# Patient Record
(demographics unavailable — no encounter records)

---

## 2024-10-08 NOTE — HISTORY OF PRESENT ILLNESS
[Dizziness] : dizziness [de-identified] : 85 yo male with chronic AF, CAD s/p PCI, cardiomyopathy ([place] True Style single-chamber ICD).  He had syncope and ICD shock in ER November 2021, with episodes of VF on ICD. He was started on amiodarone, and treated for elevated BP. Cath was ok. ICD reached ARCELIA on 11/30/21. He underwent generator replacement in February 2022. He had an episode of apparent lead noise in May 2023 detected as arrhythmia, but no shocks were delivered. More recently has noted dizziness and low BP.  Medications have been adjusted.  He notes improvement of dizziness.  He denies syncope.  Device check shows a chronically elevated RV threshold, but otherwise normal ICD function and no recent ventricular arrhythmias.  TTE 1/2024: LA moderately dilated, small pericardial effusion w/o tamponade, EF 58%, mild AR, no AS, mild MR, mild pulm HTN.

## 2024-10-08 NOTE — REVIEW OF SYSTEMS
[Fever] : no fever [Chills] : no chills [Dyspnea on exertion] : not dyspnea during exertion [Chest Discomfort] : no chest discomfort [Palpitations] : no palpitations [Syncope] : no syncope [Cough] : no cough [Abdominal Pain] : no abdominal pain [Nausea] : no nausea [Vomiting] : no vomiting

## 2024-10-08 NOTE — PHYSICAL EXAM
[General Appearance - Well Developed] : well developed [General Appearance - Well Nourished] : well nourished [General Appearance - In No Acute Distress] : no acute distress [Heart Sounds] : normal S1 and S2 [Murmurs] : no murmurs present [] : no respiratory distress [Respiration, Rhythm And Depth] : normal respiratory rhythm and effort [Auscultation Breath Sounds / Voice Sounds] : lungs were clear to auscultation bilaterally [Left Infraclavicular] : left infraclavicular area [Clean] : clean [Dry] : dry [Well-Healed] : well-healed [Abdomen Soft] : soft [Abdomen Tenderness] : non-tender [Nail Clubbing] : no clubbing of the fingernails [Erythema] : not erythematous [Warm] : not warm [Tender] : not tender

## 2025-03-25 NOTE — PHYSICAL EXAM
[Outer Ear] : the ears and nose were normal in appearance [Hearing Threshold Finger Rub Not [place]] : hearing was normal [Examination Of The Oral Cavity] : the lips and gums were normal [] : no respiratory distress [Exaggerated Use Of Accessory Muscles For Inspiration] : no accessory muscle use [Nondistended] : nondistended [Cervical Lymph Nodes Enlarged Posterior Bilaterally] : posterior cervical [Cervical Lymph Nodes Enlarged Anterior Bilaterally] : anterior cervical [Musculoskeletal - Swelling] : no joint swelling [Normal] : oriented to person, place and time, the affect was normal, the mood was normal and not anxious [de-identified] : healing incision right forehead from recent Mohs; sticthes still in place; no bleeding or discharge [de-identified] : irregularly shaped 1.5 x 1.5 cm erythematous scaly lesion left upper lip

## 2025-03-25 NOTE — REVIEW OF SYSTEMS
[Skin Wound] : skin wound [Negative] : Allergic/Immunologic [de-identified] : healing incision right forehead from recent Mohs

## 2025-03-25 NOTE — VITALS
[Maximal Pain Intensity: 0/10] : 0/10 [Date: ____________] : Patient's last distress assessment performed on [unfilled]. [1 - Distress Level] : Distress Level: 1 [80: Normal activity with effort; some signs or symptoms of disease.] : 80: Normal activity with effort; some signs or symptoms of disease.

## 2025-03-25 NOTE — REVIEW OF SYSTEMS
[Skin Wound] : skin wound [Negative] : Allergic/Immunologic [de-identified] : healing incision right forehead from recent Mohs

## 2025-03-25 NOTE — PHYSICAL EXAM
[Outer Ear] : the ears and nose were normal in appearance [Hearing Threshold Finger Rub Not [place]] : hearing was normal [Examination Of The Oral Cavity] : the lips and gums were normal [] : no respiratory distress [Exaggerated Use Of Accessory Muscles For Inspiration] : no accessory muscle use [Nondistended] : nondistended [Cervical Lymph Nodes Enlarged Posterior Bilaterally] : posterior cervical [Cervical Lymph Nodes Enlarged Anterior Bilaterally] : anterior cervical [Musculoskeletal - Swelling] : no joint swelling [Normal] : oriented to person, place and time, the affect was normal, the mood was normal and not anxious [de-identified] : healing incision right forehead from recent Mohs; sticthes still in place; no bleeding or discharge [de-identified] : irregularly shaped 1.5 x 1.5 cm erythematous scaly lesion left upper lip

## 2025-03-25 NOTE — HISTORY OF PRESENT ILLNESS
[FreeTextEntry1] : Mr. Jerez is an 85-year-old male with multiple skin cancers, presenting today for consultation for radiation. He has previous history of radiation 6 years ago to the left nasal ala and tip of nose, treated by Dr. Cassidy.  In 2023, he was found to have basal cell carcinoma to his left upper cutaneous lip but d/t multiple life events, patient deferred treatment at that time.  02/24/25 Biopsy with Dr. Elizalde. Dermatopathology revealed: 1. Right forehead - squamous cell carcinoma, at least in SITU. Note: The lesion is present at the base of the specimen, and invasive carcinoma cannot be excluded. 2. Right superior central malar cheek - surface of hyperplastic solar keratosis. Note: because the lesion is present at the base of the specimen, the surface of a squamous cell carcinoma cannot be excluded.  03/25/25 Mr. Jerez presents today to discuss radiation to the left upper cutaneous lip. Most recently he had Mohs to the right temple for squamous cell carcinoma with Dr. Elizalde. He continues with the basal cell on his lip which he elected not to have surgery for because he was concerned with poor cosmesis/wound healing in the center of his face.

## 2025-03-25 NOTE — HISTORY OF PRESENT ILLNESS
[FreeTextEntry1] : Mr. Jerez is an 85-year-old male with multiple skin cancers, presenting today for consultation for radiation. He has previous history of radiation 6 years ago to the left nasal ala and tip of nose, treated by Dr. Cassidy.  In 2023, he was found to have basal cell carcinoma to his left upper cutaneous lip but d/t multiple life events, patient deferred treatment at that time.  02/24/25 Biopsy with Dr. Elizalde. Dermatopathology revealed: 1. Right forehead - squamous cell carcinoma, at least in SITU. Note: The lesion is present at the base of the specimen, and invasive carcinoma cannot be excluded. 2. Right superior central malar cheek - surface of hyperplastic solar keratosis. Note: because the lesion is present at the base of the specimen, the surface of a squamous cell carcinoma cannot be excluded.  03/25/25 Mr. Jerez presents today to discuss radiation to the left upper cutaneous lip. Most recently he had Mohs to the right temple for squamous cell carcinoma with Dr. Elizalde. He continues with the basal cell on his lip which he elected not to have surgery for because he was concerned with poor cosmesis/wound healing in the center of his face. [place] Counseling: I discussed with the patient the risks of tildrakizumab including but not limited to immunosuppression, malignancy, posterior leukoencephalopathy syndrome, and serious infections.  The patient understands that monitoring is required including a PPD at baseline and must alert us or the primary physician if symptoms of infection or other concerning signs are noted.

## 2025-05-20 NOTE — PHYSICAL EXAM
[] : no respiratory distress [Exaggerated Use Of Accessory Muscles For Inspiration] : no accessory muscle use [Normal] : oriented to person, place and time, the affect was normal, the mood was normal and not anxious [de-identified] : moderate erythema with dry desquamation around the lesion on the left upper lip

## 2025-05-20 NOTE — HISTORY OF PRESENT ILLNESS
[FreeTextEntry1] : 5/14/25 FX 5/8 Doing well with treatment so far.  He is using Aquaphor to the lip as directed.

## 2025-05-20 NOTE — REVIEW OF SYSTEMS
[Pruritus: Grade 0] : Pruritus: Grade 0 [Skin Hyperpigmentation: Grade 0] : Skin Hyperpigmentation: Grade 0 [Fatigue: Grade 0] : Fatigue: Grade 0 [Dermatitis Radiation: Grade 1 - Faint erythema or dry desquamation] : Dermatitis Radiation: Grade 1 - Faint erythema or dry desquamation

## 2025-05-20 NOTE — DISEASE MANAGEMENT
[Clinical] : TNM Stage: c [N/A] : Currently not applicable [TTNM] : - [NTNM] : - [MTNM] : - [de-identified] : 8222 [de-identified] : [id number] [de-identified] : lip